# Patient Record
Sex: MALE | Race: WHITE | NOT HISPANIC OR LATINO | Employment: UNEMPLOYED | URBAN - METROPOLITAN AREA
[De-identification: names, ages, dates, MRNs, and addresses within clinical notes are randomized per-mention and may not be internally consistent; named-entity substitution may affect disease eponyms.]

---

## 2023-10-01 ENCOUNTER — APPOINTMENT (OUTPATIENT)
Dept: CT IMAGING | Facility: CLINIC | Age: 38
End: 2023-10-01
Attending: EMERGENCY MEDICINE
Payer: COMMERCIAL

## 2023-10-01 ENCOUNTER — HOSPITAL ENCOUNTER (EMERGENCY)
Facility: CLINIC | Age: 38
Discharge: HOME | End: 2023-10-01
Attending: EMERGENCY MEDICINE | Admitting: EMERGENCY MEDICINE
Payer: COMMERCIAL

## 2023-10-01 VITALS
TEMPERATURE: 98.8 F | HEART RATE: 86 BPM | WEIGHT: 258.38 LBS | OXYGEN SATURATION: 99 % | SYSTOLIC BLOOD PRESSURE: 139 MMHG | BODY MASS INDEX: 38.27 KG/M2 | RESPIRATION RATE: 18 BRPM | HEIGHT: 69 IN | DIASTOLIC BLOOD PRESSURE: 100 MMHG

## 2023-10-01 DIAGNOSIS — R19.7 BLOODY DIARRHEA: ICD-10-CM

## 2023-10-01 DIAGNOSIS — A49.8 STEC (SHIGA TOXIN-PRODUCING ESCHERICHIA COLI) INFECTION: ICD-10-CM

## 2023-10-01 DIAGNOSIS — K51.00 PANCOLITIS (H): Primary | ICD-10-CM

## 2023-10-01 LAB
ABO/RH(D): NORMAL
ALBUMIN SERPL BCG-MCNC: 5 G/DL (ref 3.5–5.2)
ALBUMIN UR-MCNC: 70 MG/DL
ALP SERPL-CCNC: 134 U/L (ref 40–129)
ALT SERPL W P-5'-P-CCNC: 52 U/L (ref 0–70)
ANION GAP SERPL CALCULATED.3IONS-SCNC: 16 MMOL/L (ref 7–15)
ANTIBODY SCREEN: NEGATIVE
APPEARANCE UR: ABNORMAL
APTT PPP: 32 SECONDS (ref 22–38)
AST SERPL W P-5'-P-CCNC: 41 U/L (ref 0–45)
BASOPHILS # BLD AUTO: 0.1 10E3/UL (ref 0–0.2)
BASOPHILS NFR BLD AUTO: 1 %
BILIRUB SERPL-MCNC: 2.6 MG/DL
BILIRUB UR QL STRIP: ABNORMAL
BUN SERPL-MCNC: 10.3 MG/DL (ref 6–20)
C DIFF TOX B STL QL: NEGATIVE
CALCIUM SERPL-MCNC: 9.5 MG/DL (ref 8.6–10)
CHLORIDE SERPL-SCNC: 100 MMOL/L (ref 98–107)
COLOR UR AUTO: YELLOW
CREAT SERPL-MCNC: 0.98 MG/DL (ref 0.67–1.17)
DEPRECATED HCO3 PLAS-SCNC: 21 MMOL/L (ref 22–29)
EGFRCR SERPLBLD CKD-EPI 2021: >90 ML/MIN/1.73M2
EOSINOPHIL # BLD AUTO: 0.2 10E3/UL (ref 0–0.7)
EOSINOPHIL NFR BLD AUTO: 1 %
ERYTHROCYTE [DISTWIDTH] IN BLOOD BY AUTOMATED COUNT: 13.3 % (ref 10–15)
GLUCOSE SERPL-MCNC: 106 MG/DL (ref 70–99)
GLUCOSE UR STRIP-MCNC: NEGATIVE MG/DL
HCO3 BLDV-SCNC: 23 MMOL/L (ref 21–28)
HCT VFR BLD AUTO: 47 % (ref 40–53)
HEMOCCULT STL QL: NEGATIVE
HGB BLD-MCNC: 16.6 G/DL (ref 13.3–17.7)
HGB UR QL STRIP: ABNORMAL
HYALINE CASTS: 4 /LPF
IMM GRANULOCYTES # BLD: 0.1 10E3/UL
IMM GRANULOCYTES NFR BLD: 0 %
INR PPP: 1.05 (ref 0.85–1.15)
KETONES UR STRIP-MCNC: >150 MG/DL
LACTATE BLD-SCNC: 1.1 MMOL/L
LEUKOCYTE ESTERASE UR QL STRIP: NEGATIVE
LIPASE SERPL-CCNC: 28 U/L (ref 13–60)
LYMPHOCYTES # BLD AUTO: 2 10E3/UL (ref 0.8–5.3)
LYMPHOCYTES NFR BLD AUTO: 16 %
MCH RBC QN AUTO: 29.9 PG (ref 26.5–33)
MCHC RBC AUTO-ENTMCNC: 35.3 G/DL (ref 31.5–36.5)
MCV RBC AUTO: 85 FL (ref 78–100)
MONOCYTES # BLD AUTO: 0.9 10E3/UL (ref 0–1.3)
MONOCYTES NFR BLD AUTO: 7 %
MUCOUS THREADS #/AREA URNS LPF: PRESENT /LPF
NEUTROPHILS # BLD AUTO: 9.4 10E3/UL (ref 1.6–8.3)
NEUTROPHILS NFR BLD AUTO: 75 %
NITRATE UR QL: NEGATIVE
NRBC # BLD AUTO: 0 10E3/UL
NRBC BLD AUTO-RTO: 0 /100
PCO2 BLDV: 40 MM HG (ref 40–50)
PH BLDV: 7.37 [PH] (ref 7.32–7.43)
PH UR STRIP: 5.5 [PH] (ref 5–7)
PLATELET # BLD AUTO: 301 10E3/UL (ref 150–450)
PO2 BLDV: 25 MM HG (ref 25–47)
POTASSIUM SERPL-SCNC: 3.8 MMOL/L (ref 3.4–5.3)
PROT SERPL-MCNC: 7.6 G/DL (ref 6.4–8.3)
RBC # BLD AUTO: 5.55 10E6/UL (ref 4.4–5.9)
RBC URINE: 3 /HPF
SAO2 % BLDV: 43 % (ref 94–100)
SODIUM SERPL-SCNC: 137 MMOL/L (ref 135–145)
SP GR UR STRIP: 1.03 (ref 1–1.03)
SPECIMEN EXPIRATION DATE: NORMAL
UROBILINOGEN UR STRIP-MCNC: 3 MG/DL
WBC # BLD AUTO: 12.6 10E3/UL (ref 4–11)
WBC URINE: <1 /HPF

## 2023-10-01 PROCEDURE — 83690 ASSAY OF LIPASE: CPT | Performed by: EMERGENCY MEDICINE

## 2023-10-01 PROCEDURE — 96360 HYDRATION IV INFUSION INIT: CPT | Mod: 59

## 2023-10-01 PROCEDURE — 250N000009 HC RX 250: Performed by: EMERGENCY MEDICINE

## 2023-10-01 PROCEDURE — 80053 COMPREHEN METABOLIC PANEL: CPT | Performed by: EMERGENCY MEDICINE

## 2023-10-01 PROCEDURE — 82803 BLOOD GASES ANY COMBINATION: CPT

## 2023-10-01 PROCEDURE — 87493 C DIFF AMPLIFIED PROBE: CPT | Mod: XU | Performed by: EMERGENCY MEDICINE

## 2023-10-01 PROCEDURE — 74177 CT ABD & PELVIS W/CONTRAST: CPT

## 2023-10-01 PROCEDURE — 258N000003 HC RX IP 258 OP 636: Performed by: EMERGENCY MEDICINE

## 2023-10-01 PROCEDURE — 81001 URINALYSIS AUTO W/SCOPE: CPT | Performed by: EMERGENCY MEDICINE

## 2023-10-01 PROCEDURE — 85004 AUTOMATED DIFF WBC COUNT: CPT | Performed by: EMERGENCY MEDICINE

## 2023-10-01 PROCEDURE — 82272 OCCULT BLD FECES 1-3 TESTS: CPT | Performed by: EMERGENCY MEDICINE

## 2023-10-01 PROCEDURE — 85730 THROMBOPLASTIN TIME PARTIAL: CPT | Performed by: EMERGENCY MEDICINE

## 2023-10-01 PROCEDURE — 250N000011 HC RX IP 250 OP 636: Performed by: EMERGENCY MEDICINE

## 2023-10-01 PROCEDURE — 99285 EMERGENCY DEPT VISIT HI MDM: CPT | Mod: 25

## 2023-10-01 PROCEDURE — 36415 COLL VENOUS BLD VENIPUNCTURE: CPT | Performed by: EMERGENCY MEDICINE

## 2023-10-01 PROCEDURE — 87507 IADNA-DNA/RNA PROBE TQ 12-25: CPT | Performed by: EMERGENCY MEDICINE

## 2023-10-01 PROCEDURE — 86901 BLOOD TYPING SEROLOGIC RH(D): CPT | Performed by: EMERGENCY MEDICINE

## 2023-10-01 PROCEDURE — 85610 PROTHROMBIN TIME: CPT | Performed by: EMERGENCY MEDICINE

## 2023-10-01 RX ORDER — IOPAMIDOL 755 MG/ML
500 INJECTION, SOLUTION INTRAVASCULAR ONCE
Status: COMPLETED | OUTPATIENT
Start: 2023-10-01 | End: 2023-10-01

## 2023-10-01 RX ADMIN — SODIUM CHLORIDE 65 ML: 9 INJECTION, SOLUTION INTRAVENOUS at 18:32

## 2023-10-01 RX ADMIN — IOPAMIDOL 100 ML: 755 INJECTION, SOLUTION INTRAVENOUS at 18:32

## 2023-10-01 RX ADMIN — SODIUM CHLORIDE 1000 ML: 9 INJECTION, SOLUTION INTRAVENOUS at 18:11

## 2023-10-01 ASSESSMENT — ENCOUNTER SYMPTOMS
BLOOD IN STOOL: 1
FREQUENCY: 0
COUGH: 0
RHINORRHEA: 0
SHORTNESS OF BREATH: 0
HEMATURIA: 0
DYSURIA: 0
NAUSEA: 0
FEVER: 0
VOMITING: 0
DIARRHEA: 1
ABDOMINAL PAIN: 1
CHILLS: 1

## 2023-10-01 ASSESSMENT — ACTIVITIES OF DAILY LIVING (ADL): ADLS_ACUITY_SCORE: 35

## 2023-10-01 NOTE — ED PROVIDER NOTES
History     Chief Complaint:  Rectal Bleeding     HPI   Rayshawn Mansfield is a 38 year old male who presents with his spouse for evaluation of rectal bleeding. The patient reports he has had diarrhea for three days and noticed blood in his diarrhea today. States that he had diarrhea this morning without blood but around 1400 he noticed some bright red clot like blood in the water of his stool. Notes that his episodes of diarrhea are becoming more frequent and the skin around his rectum is raw from wiping. Adds that he has chills and lower abdominal cramping that occasional radiates superiorly but is otherwise non radiating. States that this is a 6/10 on the severity scale. Notes that having a bowel movement provides temporary relief from his pain. The patient's spouse reports there may have been different food exposures between her and the patient prior to symptom onset. Denies known sick exposures as well as personal or family history of Crohn's disease, ulcerative colitis, and colon cancer.     Review of Systems   Constitutional:  Positive for chills. Negative for fever.   HENT:  Negative for congestion and rhinorrhea.    Respiratory:  Negative for cough and shortness of breath.    Cardiovascular:  Negative for chest pain.   Gastrointestinal:  Positive for abdominal pain, blood in stool and diarrhea. Negative for nausea and vomiting.   Genitourinary:  Negative for dysuria, frequency and hematuria.     Independent Historian:   Spouse/Partner - They report as noted above.    Review of External Notes:   None     Medications:    Albuterol  Escitalopram  Fluticasone  Simvastatin    Past Medical History:    Hyperlipidemia  Asthma  Abnormal liver function  Anxiety    Past Surgical History:    Hernia repair  [place] teeth removal  Appendectomy      Physical Exam   Patient Vitals for the past 24 hrs:   BP Temp Temp src Pulse Resp SpO2 Height Weight   10/01/23 2021 -- -- -- 86 -- -- -- --   10/01/23 1928 -- 98.8  F (37.1  C)  "Oral -- -- -- -- --   10/01/23 1618 (!) 139/100 98.4  F (36.9  C) Oral 95 18 99 % 1.753 m (5' 9\") 117.2 kg (258 lb 6.1 oz)      Constitutional:       General: Not in acute distress.        Appearance: Normal appearance.   HENT:      Head: Normocephalic and atraumatic.   Eyes:      Extraocular Movements: Extraocular movements intact.      Conjunctiva/sclera: Conjunctivae normal.   Cardiovascular:      Rate and Rhythm: Normal rate and regular rhythm.   Pulmonary:      Effort: Pulmonary effort is normal. No respiratory distress.      Breath sounds: Normal breath sounds.   Abdominal:      General: Abdomen is flat. There is no distension.      Palpations: Abdomen is soft.      Tenderness: There is no abdominal tenderness.   Musculoskeletal:      Cervical back: Normal range of motion. No rigidity.      Right lower leg: No edema.      Left lower leg: No edema.   Skin:     General: Skin is warm and dry.   Neurological:      General: No focal deficit present.      Mental Status: Alert and oriented to person, place, and time.   Psychiatric:         Mood and Affect: Mood normal.         Behavior: Behavior normal.    Emergency Department Course   Imaging:  CT Abdomen Pelvis w Contrast   Final Result   IMPRESSION:    1.  Changes of pancolitis. No ascites.      2.  Previous appendectomy.         Report per radiology    Laboratory:  Labs Ordered and Resulted from Time of ED Arrival to Time of ED Departure   COMPREHENSIVE METABOLIC PANEL - Abnormal       Result Value    Sodium 137      Potassium 3.8      Carbon Dioxide (CO2) 21 (*)     Anion Gap 16 (*)     Urea Nitrogen 10.3      Creatinine 0.98      GFR Estimate >90      Calcium 9.5      Chloride 100      Glucose 106 (*)     Alkaline Phosphatase 134 (*)     AST 41      ALT 52      Protein Total 7.6      Albumin 5.0      Bilirubin Total 2.6 (*)    ROUTINE UA WITH MICROSCOPIC REFLEX TO CULTURE - Abnormal    Color Urine Yellow      Appearance Urine Slightly Cloudy (*)     Glucose " Urine Negative      Bilirubin Urine Small (*)     Ketones Urine >150 (*)     Specific Gravity Urine 1.030      Blood Urine Trace (*)     pH Urine 5.5      Protein Albumin Urine 70 (*)     Urobilinogen Urine 3.0 (*)     Nitrite Urine Negative      Leukocyte Esterase Urine Negative      Mucus Urine Present (*)     RBC Urine 3 (*)     WBC Urine <1      Hyaline Casts Urine 4 (*)    CBC WITH PLATELETS AND DIFFERENTIAL - Abnormal    WBC Count 12.6 (*)     RBC Count 5.55      Hemoglobin 16.6      Hematocrit 47.0      MCV 85      MCH 29.9      MCHC 35.3      RDW 13.3      Platelet Count 301      % Neutrophils 75      % Lymphocytes 16      % Monocytes 7      % Eosinophils 1      % Basophils 1      % Immature Granulocytes 0      NRBCs per 100 WBC 0      Absolute Neutrophils 9.4 (*)     Absolute Lymphocytes 2.0      Absolute Monocytes 0.9      Absolute Eosinophils 0.2      Absolute Basophils 0.1      Absolute Immature Granulocytes 0.1      Absolute NRBCs 0.0     ISTAT GASES LACTATE VENOUS POCT - Abnormal    Lactic Acid POCT 1.1      Bicarbonate Venous POCT 23      O2 Sat, Venous POCT 43 (*)     pCO2 Venous POCT 40      pH Venous POCT 7.37      pO2 Venous POCT 25     INR - Normal    INR 1.05     PARTIAL THROMBOPLASTIN TIME - Normal    aPTT 32     LIPASE - Normal    Lipase 28     OCCULT BLOOD STOOL - Normal    Occult Blood Negative     TYPE AND SCREEN, ADULT    ABO/RH(D) A POS      Antibody Screen Negative      SPECIMEN EXPIRATION DATE [id number]     ABO/RH TYPE AND SCREEN      Emergency Department Course & Assessments:     Interventions:  Medications   sodium chloride 0.9% BOLUS 1,000 mL (0 mLs Intravenous Stopped 10/1/23 1928)   iopamidol (ISOVUE-370) solution 500 mL (100 mLs Intravenous $Given 10/1/23 1832)   for CT scan flush use (65 mLs Intravenous $Given 10/1/23 1832)      Independent Interpretation (X-rays, CTs, rhythm strip):  None    Assessments/Consultations/Discussion of Management or Tests:  ED Course as of  10/01/23 2357   Sun Oct 01, 2023   1731 I obtained history and examined the patient as noted above.    1745 I rechecked the patient. I performed digital rectal exam. No bright red blood observed.  Hemoccult sent.   1854 CT Abdomen Pelvis w Contrast  pancolitis   1926 Notified by wife that patient having significant chills suddenly.  Will obtain temperature and i-STAT lactic if febrile.   1942 Lactic Acid POCT: 1.1  wnl   2010 Bilirubin Total(!): 2.6  Potentially secondary to dehydration/ongoing diarrheal illness   2013 Patient updated on lab and image findings.  Patient well-appearing.  Vital signs stable.  No evidence of sepsis at this time.  Fecal occult negative.  Stool PCR pending.  Nonetheless, given pancolitis on CT scan, suspect infectious colitis especially given mild leukocytosis.  Patient and wife feel comfortable with being discharged and follow-up with lab results per mobile aubrey and call their PCP on Monday for scheduling a follow-up appointment for result review.  Given patient's otherwise well appearance and stable vital signs, no indication for initiation of antibiotics at this time especially given uncertainty of infectious source.  Patient and family okay with waiting on stool culture before initiation of antibiotics.  Benign abdominal exam.  Encouraged continued oral fluid hydration.  Discussed return precautions.  Answered all questions.  Patient voiced understanding and agreement with plan.   2345 Shiga-like toxin-producing E. coli (STEC)(!): Positive  I was able to make a patient follow-up call with patient regarding his STEC results.  Given risk of HUS, antibiotics not indicated at this time and may potentially be harmful.  No specific treatment indicated, but prior to the on remaining hydrated.  Patient states that he is currently drinking his Pedialyte.  Advised for patient that if he is having continued worsening diarrhea or signs of dehydration, he needs to return to the ER for further  treatment.  Patient voiced understanding and agreement with plan.  He will make primary care appointment tomorrow with PCP.       Social Determinants of Health affecting care:   None    Disposition:  The patient was discharged to home.     Impression & Plan    Medical Decision Makin-year-old male as described above presents to the emergency department with lower abdominal cramping and diarrhea for 3 days and now 1 day history of bright red blood per rectum.  Patient hemodynamically stable at time evaluation.  Afebrile.  No family history of inflammatory bowel disease or colon malignancy.  Broad differential diagnosis considered includes, but not limited to, infectious colitis, inflammatory bowel disease, bowel ischemia, hemorrhoids, anal fissure, and colorectal malignancy.  We will obtain abdominal pain lab work.  Stool studies.  CT and pelvis with contrast for evaluation for above discussed differentials.  IV fluid hydration.  If work-up negative, likely discharge to outpatient supportive management and follow-up with PCP.  We will try to collect stool studies in the ED.  However if patient unable to provide stool sample, will discharge with outpatient stool collection kit.  Discussed care plan with patient and wife at bedside who voiced understanding and agreement with plan.  Answered all questions.  Additional work-up and orders as listed in chart.     Please refer to ED course above for details on the patient's treatment course and any changes or updates in care plan beyond my initial evaluation and MDM.    Diagnosis:    ICD-10-CM    1. Pancolitis (H)  K51.00       2. Bloody diarrhea  R19.7       3. STEC (Shiga toxin-producing Escherichia coli) infection  A49.8           Scribe Disclosure:  LONNY, Xi Mccray, am serving as a scribe at 5:31 PM on 10/1/2023 to document services personally performed by Frank Loya DO based on my observations and the provider's statements to me.     10/1/2023   Shalini  DO Shalini Marie Ferris, DO  10/01/23 5264

## 2023-10-02 ENCOUNTER — TELEPHONE (OUTPATIENT)
Dept: EMERGENCY MEDICINE | Facility: CLINIC | Age: 38
End: 2023-10-02
Payer: COMMERCIAL

## 2023-10-02 NOTE — DISCHARGE INSTRUCTIONS
Please follow-up with your primary care provider and/or specialist regarding your visit to the ER today.    Please follow-up on MyChart or with your primary care provider regarding your stool culture results.    Please return to the emergency department should you experience any of the symptoms we specifically discussed, including but not limited to recurrence or worsening of your symptoms, or development of any new and concerning symptoms such as worsening bloody stools, severe dehydration, worsening abdominal pain, fevers, nausea/vomiting, or if symptoms persist for greater than 1 week.    Please continue oral fluid hydration such as Pedialyte.

## 2023-10-02 NOTE — TELEPHONE ENCOUNTER
[facility]) Emergency Department/Urgent Care Lab result notification  [Note:  ED Lab Results RN will reference the [facility] Emergency Dept visit note prior to contacting patient AND/OR prior to consulting Emergency Dept Provider.  Highlights of Emergency Dept visit in information summary at the bottom of this telephone note]    1. Reason for call  Notify of lab results  Assess patient symptoms [if necessary]  Review ED Providers recommendations/discharge instructions (if necessary)  Advise per [facility] ED lab result protocol    2. Lab Result (including Rx patient on, if applicable).  If culture, copy of lab report at bottom.  Final Enteric Bacteria and Virus Panel by ROS Stool is POSITIVE for SHIGA-LIKE TOXIN PRODUCING E.COLI (STEC)  Recommendations in treatment per [facility] ED Lab Result Enteric Bacteria and Virus Panel protocol.    3. RN Assessment (Patient's current Symptoms):  Time of call: 10/2/2023 10:36 AM  Assessment: Spouse present at ED visit, patient currently on day 4 of illness, blood in stool reported  Writer notes per ED provider    Shiga-like toxin-producing E. coli (STEC)(!): Positive  I was able to make a patient follow-up call with patient regarding his STEC results.  Given risk of HUS, antibiotics not indicated at this time and may potentially be harmful.  No specific treatment indicated, but prior to the on remaining hydrated.  Patient states that he is currently drinking his Pedialyte.  Advised for patient that if he is having continued worsening diarrhea or signs of dehydration, he needs to return to the ER for further treatment.  Patient voiced understanding and agreement with plan.  He will make primary care appointment tomorrow with PCP.     No additional action needed at this time.    Information summary from Emergency Dept/Urgent Care visit on 10/1/23  Allergies Allergies   Allergen Reactions    Kiwi     Penicillins     Pineapple       Weight, if  applicable Wt Readings from Last 2 Encounters:   10/01/23 117.2 kg (258 lb 6.1 oz)      Coumadin/Warfarin [Yes /No] NO   Creatinine Level (mg/dl) Creatinine   Date Value Ref Range Status   10/01/2023 0.98 0.67 - 1.17 mg/dL Final      Creatinine clearance (ml/min), if applicable Serum creatinine: 0.98 mg/dL 10/01/23 1750  Estimated creatinine clearance: 129.1 mL/min       Copy of Lab report (if applicable)  Component      Latest Ref Rng 10/1/2023  6:11 PM   Campylobacter species      Negative  Negative    SALMONELLA SPECIES      Negative  Negative    Vibrio species      Negative  Negative    Vibrio cholerae      Negative  Negative    YERSINIA ENTEROCOLITICA      Negative  Negative    Enteropathogenic E. coli (EPEC)      Negative, NA  NA    Shiga-like toxin-producing E. coli (STEC)      Negative  Positive !    Shigella/Enteroinvasive E. coli (EIEC)      Negative  Negative    Cryptosporidium species      Negative  Negative    Giardia lamblia      Negative  Negative    Norovirus Gl/Gll      Negative  Negative    ROTAVIRUS A      Negative  Negative    Plesiomonas shigelloides      Negative  Negative    Enteroaggregative E. coli (EAEC)      Negative  Negative    Enterotoxigenic E. coli (ETEC)      Negative  Negative    E. coli O157      Negative, NA  Negative    Cyclospora cayetanensis      Negative  Negative    Entamoeba histolytica      Negative  Negative    Adenovirus F40/41      Negative  Negative    Astrovirus      Negative  Negative    Sapovirus      Negative  Negative    C Difficile Toxin B by PCR      Negative  Negative       Legend:  ! Abnormal      Lindsey Hayes RN  [facility]  Emergency Dept Lab Result RN  Ph# [phone]

## 2023-10-02 NOTE — ED NOTES
Critical result received from lab. Enteric virus panel, shiga like toxin, produced e-coli. Dr. Montgomery notified. Dr. Montgomery denied need for patient to come back to ED.

## 2023-10-14 LAB
ADV 40+41 DNA STL QL NAA+NON-PROBE: NEGATIVE
ASTRO TYP 1-8 RNA STL QL NAA+NON-PROBE: NEGATIVE
C CAYETANENSIS DNA STL QL NAA+NON-PROBE: NEGATIVE
CAMPYLOBACTER DNA SPEC NAA+PROBE: NEGATIVE
CRYPTOSP DNA STL QL NAA+NON-PROBE: NEGATIVE
E COLI O157 DNA STL QL NAA+NON-PROBE: NEGATIVE
E HISTOLYT DNA STL QL NAA+NON-PROBE: NEGATIVE
EAEC ASTA GENE ISLT QL NAA+PROBE: NEGATIVE
EC STX1+STX2 GENES STL QL NAA+NON-PROBE: POSITIVE
EPEC EAE GENE STL QL NAA+NON-PROBE: ABNORMAL
ETEC LTA+ST1A+ST1B TOX ST NAA+NON-PROBE: NEGATIVE
G LAMBLIA DNA STL QL NAA+NON-PROBE: NEGATIVE
NOROVIRUS GI+II RNA STL QL NAA+NON-PROBE: NEGATIVE
P SHIGELLOIDES DNA STL QL NAA+NON-PROBE: NEGATIVE
RVA RNA STL QL NAA+NON-PROBE: NEGATIVE
SALMONELLA SP RPOD STL QL NAA+PROBE: NEGATIVE
SAPO I+II+IV+V RNA STL QL NAA+NON-PROBE: NEGATIVE
SHIGELLA SP+EIEC IPAH ST NAA+NON-PROBE: NEGATIVE
V CHOLERAE DNA SPEC QL NAA+PROBE: NEGATIVE
VIBRIO DNA SPEC NAA+PROBE: NEGATIVE
Y ENTEROCOL DNA STL QL NAA+PROBE: NEGATIVE

## 2024-06-02 ENCOUNTER — HEALTH MAINTENANCE LETTER (OUTPATIENT)
Age: 39
End: 2024-06-02

## 2025-06-15 ENCOUNTER — HEALTH MAINTENANCE LETTER (OUTPATIENT)
Age: 40
End: 2025-06-15